# Patient Record
Sex: MALE | Race: WHITE
[De-identification: names, ages, dates, MRNs, and addresses within clinical notes are randomized per-mention and may not be internally consistent; named-entity substitution may affect disease eponyms.]

---

## 2020-03-01 ENCOUNTER — HOSPITAL ENCOUNTER (EMERGENCY)
Dept: HOSPITAL 35 - ER | Age: 40
Discharge: HOME | End: 2020-03-01
Payer: COMMERCIAL

## 2020-03-01 VITALS — WEIGHT: 175 LBS | BODY MASS INDEX: 24.5 KG/M2 | HEIGHT: 71 IN

## 2020-03-01 VITALS — SYSTOLIC BLOOD PRESSURE: 136 MMHG | DIASTOLIC BLOOD PRESSURE: 71 MMHG

## 2020-03-01 DIAGNOSIS — J45.901: ICD-10-CM

## 2020-03-01 DIAGNOSIS — Z90.49: ICD-10-CM

## 2020-03-01 DIAGNOSIS — J18.9: ICD-10-CM

## 2020-03-01 DIAGNOSIS — D17.9: ICD-10-CM

## 2020-03-01 DIAGNOSIS — T17.908A: ICD-10-CM
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
## 2020-03-01 DIAGNOSIS — S16.1XXA: Primary | ICD-10-CM

## 2020-03-01 DIAGNOSIS — F17.210: ICD-10-CM
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
## 2020-03-01 LAB
ANION GAP SERPL CALC-SCNC: 14 MMOL/L (ref 7–16)
BASOPHILS NFR BLD AUTO: 0.3 % (ref 0–2)
BUN SERPL-MCNC: 8 MG/DL (ref 7–18)
CALCIUM SERPL-MCNC: 9 MG/DL (ref 8.5–10.1)
CHLORIDE SERPL-SCNC: 103 MMOL/L (ref 98–107)
CO2 SERPL-SCNC: 23 MMOL/L (ref 21–32)
CREAT SERPL-MCNC: 0.9 MG/DL (ref 0.7–1.3)
EOSINOPHIL NFR BLD: 0.5 % (ref 0–3)
ERYTHROCYTE [DISTWIDTH] IN BLOOD BY AUTOMATED COUNT: 15.8 % (ref 10.5–14.5)
GLUCOSE SERPL-MCNC: 112 MG/DL (ref 74–106)
GRANULOCYTES NFR BLD MANUAL: 79.6 % (ref 36–66)
HCT VFR BLD CALC: 56.9 % (ref 42–52)
HGB BLD-MCNC: 19 GM/DL (ref 14–18)
LYMPHOCYTES NFR BLD AUTO: 14.1 % (ref 24–44)
MCH RBC QN AUTO: 32.1 PG (ref 26–34)
MCHC RBC AUTO-ENTMCNC: 33.4 G/DL (ref 28–37)
MCV RBC: 96.2 FL (ref 80–100)
MONOCYTES NFR BLD: 5.5 % (ref 1–8)
NEUTROPHILS # BLD: 14.2 THOU/UL (ref 1.4–8.2)
PLATELET # BLD: 302 THOU/UL (ref 150–400)
POTASSIUM SERPL-SCNC: 4.1 MMOL/L (ref 3.5–5.1)
RBC # BLD AUTO: 5.91 MIL/UL (ref 4.5–6)
SODIUM SERPL-SCNC: 140 MMOL/L (ref 136–145)
WBC # BLD AUTO: 17.8 THOU/UL (ref 4–11)

## 2020-03-02 NOTE — EKG
[facility]
[place] [place]   [address]                     ELECTROCARDIOGRAM REPORT      
_______________________________________________________________________________
 
Name:       HERNÁN SILVA                  Room #:                     DEP [facility]#:      [id number]                       Account #:      [id number]  
Admission:  20    Attend Phys:                          
Discharge:  20    Date of Birth:  [birth year]  
                                                          Report #: [id number]
                                                                    [id number]
_______________________________________________________________________________
THIS REPORT FOR:  
 
cc:  LUIS - No family physician/PCP 
     FAM - No family physician/PCP 
     Rajinder Juan MD [facility]
THIS REPORT FOR:   //name//                          
 
                         [facility] ED
                                       
Test Date:    2020               Test Time:    20:10:43
Pat Name:     HERNÁN SILVA               Department:   
Patient ID:   [id number]            Room:          
Gender:                               Technician:   VERONICA
:          1980               Requested By: Dunia Crandall
Order Number: [id number] MD:   Rajinder Juan
                                 Measurements
Intervals                              Axis          
Rate:         92                       P:            68
MA:           146                      QRS:          65
QRSD:         88                       T:            63
QT:           337                                    
QTc:          417                                    
                           Interpretive Statements
Sinus rhythm
ST elevation suggests acute pericarditis versus early repolarization
Baseline wander in lead(s) V3
No previous ECG available for comparison
 
Electronically Signed On 3-2-2020 8:07:51 CST by Rajinder Juan
[email or web address]
 
 
 
 
 
 
 
 
 
 
 
 
 
 
  <ELECTRONICALLY SIGNED>
   By: Rajinder Juan MD, FACC   
  20     0807
D: 20                           _____________________________________
T: 20                           Rajinder Juan MD, [facility]     /EPI